# Patient Record
(demographics unavailable — no encounter records)

---

## 2017-08-07 NOTE — CT
CT HEAD NONCONTRAST:

 

Comparison: 5-27-17

 

Clinical history: Altered mental status, dizziness. 

 

FINDINGS: 

No evidence of acute intracranial hemorrhage, mass effect, midline shift, or ventriculomegaly. Exam 
is stable comparing to 5-27-17. 

 

IMPRESSION: 

No acute intracranial abnormalities. 

 

POS: [facility]